# Patient Record
Sex: FEMALE | URBAN - METROPOLITAN AREA
[De-identification: names, ages, dates, MRNs, and addresses within clinical notes are randomized per-mention and may not be internally consistent; named-entity substitution may affect disease eponyms.]

---
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
## 2019-04-01 NOTE — PROCEDURE: ISOTRETINOIN MONITORING
[de-identified] : At this time, due to trochanteric bursitis of the left hip, I recommend ice and elevation to the left hip.  She will be reassessed in three weeks. Dosing Month 3 (Required For Cumulative Dosing): 30mg Daily

## 2021-11-23 NOTE — PROCEDURE: COUNSELING
Detail Level: Zone Mastoid Interpolation Flap Division And Inset Text: Division and inset of the mastoid interpolation flap was performed to achieve optimal aesthetic result, restore normal anatomic appearance and avoid distortion of normal anatomy, expedite and facilitate wound healing, achieve optimal functional result and because linear closure either not possible or would produce suboptimal result. The patient was prepped and draped in the usual manner. The pedicle was infiltrated with local anesthesia. The pedicle was sectioned with a #15 blade. The pedicle was de-bulked and trimmed to match the shape of the defect. Hemostasis was achieved. The flap donor site and free margin of the flap were secured with deep buried sutures and the wound edges were re-approximated.

## 2023-10-26 NOTE — PROCEDURE: ISOTRETINOIN MONITORING
Most Recent Triglycerides (Optional): 88 Normal rate, regular rhythm.  Heart sounds S1, S2.  No murmurs, rubs or gallops.